# Patient Record
Sex: MALE | URBAN - METROPOLITAN AREA
[De-identification: names, ages, dates, MRNs, and addresses within clinical notes are randomized per-mention and may not be internally consistent; named-entity substitution may affect disease eponyms.]

---

## 2017-04-21 PROBLEM — Z00.00 ENCOUNTER FOR PREVENTIVE HEALTH EXAMINATION: Status: ACTIVE | Noted: 2017-04-21

## 2018-04-21 ENCOUNTER — EMERGENCY (EMERGENCY)
Facility: HOSPITAL | Age: 48
LOS: 0 days | Discharge: HOME | End: 2018-04-21
Attending: STUDENT IN AN ORGANIZED HEALTH CARE EDUCATION/TRAINING PROGRAM
Payer: MEDICAID

## 2018-04-21 VITALS
TEMPERATURE: 100 F | WEIGHT: 169.98 LBS | RESPIRATION RATE: 16 BRPM | HEIGHT: 67 IN | HEART RATE: 110 BPM | OXYGEN SATURATION: 97 % | SYSTOLIC BLOOD PRESSURE: 128 MMHG | DIASTOLIC BLOOD PRESSURE: 76 MMHG

## 2018-04-21 VITALS
DIASTOLIC BLOOD PRESSURE: 78 MMHG | OXYGEN SATURATION: 100 % | RESPIRATION RATE: 16 BRPM | TEMPERATURE: 100 F | HEART RATE: 100 BPM | SYSTOLIC BLOOD PRESSURE: 109 MMHG

## 2018-04-21 DIAGNOSIS — J06.9 ACUTE UPPER RESPIRATORY INFECTION, UNSPECIFIED: ICD-10-CM

## 2018-04-21 DIAGNOSIS — J00 ACUTE NASOPHARYNGITIS [COMMON COLD]: ICD-10-CM

## 2018-04-21 PROCEDURE — 71046 X-RAY EXAM CHEST 2 VIEWS: CPT | Mod: 26

## 2018-04-21 PROCEDURE — 99283 EMERGENCY DEPT VISIT LOW MDM: CPT

## 2018-04-21 RX ORDER — IBUPROFEN 200 MG
600 TABLET ORAL ONCE
Refills: 0 | Status: COMPLETED | OUTPATIENT
Start: 2018-04-21 | End: 2018-04-21

## 2018-04-21 RX ADMIN — Medication 1 TABLET(S): at 10:55

## 2018-04-21 RX ADMIN — Medication 600 MILLIGRAM(S): at 10:55

## 2018-04-21 NOTE — ED PROVIDER NOTE - OBJECTIVE STATEMENT
47 year old male presents today c/o one week history of cold symptoms, pt c/o sore throat, subjective fevers, cough, chills and headache (-) sick contacts (-) recent travel (-) nausea (-) vomiting (-) diarrhea  +intermittent mild nausea +decreased appetite +fluid intake

## 2019-04-03 ENCOUNTER — APPOINTMENT (OUTPATIENT)
Dept: HEART AND VASCULAR | Facility: CLINIC | Age: 49
End: 2019-04-03

## 2019-06-26 ENCOUNTER — APPOINTMENT (OUTPATIENT)
Dept: HEART AND VASCULAR | Facility: CLINIC | Age: 49
End: 2019-06-26

## 2019-07-31 ENCOUNTER — APPOINTMENT (OUTPATIENT)
Dept: HEART AND VASCULAR | Facility: CLINIC | Age: 49
End: 2019-07-31
Payer: MEDICAID

## 2019-07-31 VITALS — HEART RATE: 70 BPM | DIASTOLIC BLOOD PRESSURE: 90 MMHG | SYSTOLIC BLOOD PRESSURE: 140 MMHG

## 2019-07-31 DIAGNOSIS — I10 ESSENTIAL (PRIMARY) HYPERTENSION: ICD-10-CM

## 2019-07-31 PROCEDURE — 99205 OFFICE O/P NEW HI 60 MIN: CPT

## 2019-07-31 NOTE — HISTORY OF PRESENT ILLNESS
[FreeTextEntry1] : 48-year-old man with a past medical history of hypertension here for first evaluation with his provider.\par The patient reports feeling fine, denies chest pain, shortness of breath, palpitations. He has excellent exercise tolerance. He reports he ran out of medications and has not been taking amlodipine 5 mg in the last 5 days.\par He reports he is scheduled for blood test with his PMD next week.\par \par PMH \par HTN\par \par ALL\par NKDA \par \par MEDS\par amlodipine 5 mg daily \par \par SH\par  no smoke, no etoh, no drugs\par \par EKG 10/10/2018 SR, wnl\par Echocardiogram 7/31/2019 (preliminary report) normal left ventricular size and function, trace MR, trace TR

## 2019-07-31 NOTE — ASSESSMENT
[FreeTextEntry1] : 48-year-old man with a past medical history of hypertension here for first evaluation with his provider.\par \par Hypertension\par -Borderline high today in the setting of medication noncompliance\par -Recommended to restart  amlodipine 5 mg  daily\par -Recommended to check the blood pressure at home ( gave rx from BP device today) and report BP persistently >140/90\par -preliminary echo wnl\par -f/u official echo report\par -low salt diet and exercise \par \par HLD\par -obtain lipid panel from PMD\par -healthy diet and exercise \par \par \par ROUTINE VISIT\par -The patient is asymptomatic he is excellent exercise tolerance\par -continue with blood pressure control and f/u HLD\par \par f/u in 2 months

## 2019-07-31 NOTE — PHYSICAL EXAM
[Normal Appearance] : normal appearance [General Appearance - Well Developed] : well developed [Well Groomed] : well groomed [General Appearance - Well Nourished] : well nourished [No Deformities] : no deformities [General Appearance - In No Acute Distress] : no acute distress [Heart Rate And Rhythm] : heart rate and rhythm were normal [Heart Sounds] : normal S1 and S2 [Murmurs] : no murmurs present [Respiration, Rhythm And Depth] : normal respiratory rhythm and effort [Exaggerated Use Of Accessory Muscles For Inspiration] : no accessory muscle use [Auscultation Breath Sounds / Voice Sounds] : lungs were clear to auscultation bilaterally [Abdomen Soft] : soft [Abdomen Tenderness] : non-tender [Abdomen Mass (___ Cm)] : no abdominal mass palpated [Nail Clubbing] : no clubbing of the fingernails [Cyanosis, Localized] : no localized cyanosis [Petechial Hemorrhages (___cm)] : no petechial hemorrhages [] : no ischemic changes

## 2019-10-02 ENCOUNTER — APPOINTMENT (OUTPATIENT)
Dept: HEART AND VASCULAR | Facility: CLINIC | Age: 49
End: 2019-10-02

## 2020-01-29 ENCOUNTER — RX RENEWAL (OUTPATIENT)
Age: 50
End: 2020-01-29

## 2020-01-29 RX ORDER — AMLODIPINE BESYLATE 5 MG/1
5 TABLET ORAL DAILY
Qty: 30 | Refills: 0 | Status: ACTIVE | COMMUNITY
Start: 2019-07-31 | End: 1900-01-01

## 2024-01-21 NOTE — ED ADULT NURSE NOTE - FALLEN IN THE PAST
Pt. presents with bilateral testicular swelling x last night. no redness noted, no fevers. NKA, no PMH
no

## 2024-03-09 NOTE — ED ADULT NURSE NOTE - NS ED NURSE LEVEL OF CONSCIOUSNESS AFFECT
Occupational Therapy    Visit Type: treatment    Relevant History/Co-morbidities: S/p C3-4 C6-7 laminectomy and C3-T1 fusion  Cervical precautions    SUBJECTIVE  Patient agreed to participate in therapy this date.  Pt sleepy and requesting to return in PM. Pt was agreeable to issuing HEP.   Patient / Family Goal: maximize function    Pain   Patient reports pain is not an issue/concern.    OBJECTIVE            Interventions    Training provided: HEP trainingPt given pink t foam for strength and coordination exercised to bilateral hands. Pt demonstrated effective use for gross grasp and alternating pinching.   Skilled input: verbal instruction/cues  Verbal Consent: Writer verbally educated and received verbal consent for hand placement, positioning of patient, and techniques to be performed today from patient        Education:   - Present and ready to learn: patient    ASSESSMENT   Progress: progressing toward goals  Interferring components: decreased activity tolerance    Discharge needs based on today's assessment:  - Current level of function: significantly below baseline level of function  - Therapy needs at discharge: therapy 5 or more times per week  - Activities of daily living (ADLs) requiring support at discharge: bed mobility, transfers, bathing, feeding, dressing and grooming  - Impairments that require further therapy intervention: ROM, strength, activity tolerance, coordination, motor planning and sensation          PLAN (while hospitalized)  Suggestions for next session as indicated: Plan to continue with UE/LE dressing tasks, toilet transfers, and bilateral hand and UE strength and coordination  Agreement to plan and goals: patient agrees with goals and treatment plan    Documented in the chart in the following areas: Assessment/Plan.    Patient at End of Session:   Location: in bed  Safety measures: alarm system in place/re-engaged    Therapy procedure time and total treatment time can be found  documented on the Time Entry flowsheet   Calm